# Patient Record
Sex: FEMALE | Race: WHITE | URBAN - METROPOLITAN AREA
[De-identification: names, ages, dates, MRNs, and addresses within clinical notes are randomized per-mention and may not be internally consistent; named-entity substitution may affect disease eponyms.]

---

## 2018-08-13 ENCOUNTER — TELEPHONE (OUTPATIENT)
Dept: RHEUMATOLOGY | Facility: CLINIC | Age: 63
End: 2018-08-13

## 2018-08-13 NOTE — TELEPHONE ENCOUNTER
[facility] Call Center    Phone Message    May a detailed message be left on voicemail: yes    Reason for Call: Other: Pt states that Dr. Nora Benavidez from [facility] rheumatology had sent us a referral for fibromyalgia and PMR testing. Writer checked with referral team, who had not received a referral. Writer explained we had not received a referral, and informed pt that this is needed in order to schedule. Writer did not have a chance to explain the entire process. Also informed pt that rheum providers do not see for fibromyalgia.   Pt wanted to verify where the referral was sent, as she is under the impression it was. Please f/u when available. Pt left the contact info for the referral specialist at [facility], at [phone].    Action Taken: Message Routed To: [facility]

## 2018-08-20 NOTE — TELEPHONE ENCOUNTER
Call was placed to patient regarding the referral we received for fibromyalgia?PMR from Dr. Benavidez at [facility], however, there was no answer. Message was left asking patient to call the clinic back to complete an intake form and discuss if there are outside records needed. Awaiting a call back from the patient.  Karen Franks CMA  8/20/2018 2:35 PM

## 2018-08-20 NOTE — TELEPHONE ENCOUNTER
[facility] Call Center    Phone Message    May a detailed message be left on voicemail: yes    Reason for Call: Other: Pt returned Karen's call - Please call her back as soon as possible     Action Taken: see above

## 2018-08-21 NOTE — TELEPHONE ENCOUNTER
Patient called back.  She is able to be reached best after 4pm or on break from 11:45-12:15 daily.  She has been seen at [facility] in [place], [place] and [place].

## 2018-08-21 NOTE — TELEPHONE ENCOUNTER
Call attempted to patient to discuss referral without success. Left a message for patient to call.  Karen Franks CMA  8/21/2018 12:02 PM

## 2018-08-27 NOTE — TELEPHONE ENCOUNTER
[facility] Call Center    Phone Message    May a detailed message be left on voicemail: yes    Reason for Call: Other: Pt returned Karen's call, She has a very tight schedule so is hard to reach but is getting discouraged with this phone tag. SUSAN     Action Taken: Message routed to:  [facility] Please call Pt back tomorrow 8/28/18

## 2018-08-28 NOTE — TELEPHONE ENCOUNTER
[facility] Call Center    Phone Message    May a detailed message be left on voicemail: yes    Reason for Call: Other: Please follow up with pt, she is returning Karen's call.     Action Taken: Message routed to:  [facility]

## 2018-08-29 NOTE — TELEPHONE ENCOUNTER
Call attempted to reach patient without success. Left a message for patient to return my call.  Karen Franks CMA  8/29/2018 10:01 AM

## 2018-08-29 NOTE — TELEPHONE ENCOUNTER
[facility] Call Center    Phone Message    May a detailed message be left on voicemail: yes    Reason for Call: Other: Patient trying again to get a hold of Karen, she stated she would try again tomorrow.      Action Taken: Message routed to:  [facility] Rheum

## 2018-08-31 NOTE — TELEPHONE ENCOUNTER
Per Rheumatology Team, this should be seen in allergy or dermatology.  Karen Franks CMA  8/31/2018 9:31 AM

## 2018-08-31 NOTE — TELEPHONE ENCOUNTER
General Rheumatology Intake Form    Reason for referral: inpatient desensitization   Referring provider: Nora Bui at [facility]    Past Rheumatologist: Yes. Patient stated that she started with Dr. Meredith at [facility] in 1/12/2015 who told her she has degenerative arthritis and fibromyalgia. She then was referred to Dr. Hdz by her PCP Dr. Salazar who told her that she has arthralgia and wanted to rule out PMR. Patient stated that Dr. Hdz stated she did not have fibromyalgia. Patient then went to Dr. Hdz who was closer to home. Patient then saw Dr. 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     Have you been diagnosed with Fibromyalgia? yes    Who manages your care for this issue now? Dr. Nora Bui     What is your most urgent concern at this time? Patient is unsure at this time.    Have you seen any specialist related to the reason you are coming here? Yes, see above    Where are we expecting records (labs, imaging or pathology) from? In Care Everywhere for [facility].    Informed patient that I will need to bring this to the Rheumatology Team to discuss appropriate scheduling. Patient verbalized understanding.       Karen Franks CMA  8/31/2018 9:00 AM

## 2018-08-31 NOTE — TELEPHONE ENCOUNTER
Spoke with Dr. Herman in Dermatology regarding the referral for inpatient desensitization for cortisone who agreed to see the patient. His nurse will reach out to the patient to set up an appointment. Spoke to patient and gave her this information. Patient verbalized understanding.   Karen Franks CMA  8/31/2018 9:52 AM

## 2024-10-17 ENCOUNTER — MEDICAL CORRESPONDENCE (OUTPATIENT)
Dept: HEALTH INFORMATION MANAGEMENT | Facility: CLINIC | Age: 69
End: 2024-10-17
Payer: COMMERCIAL

## 2024-10-18 ENCOUNTER — TRANSCRIBE ORDERS (OUTPATIENT)
Dept: OTHER | Age: 69
End: 2024-10-18

## 2024-10-18 DIAGNOSIS — Z88.8 ALLERGY TO CORTICOSTEROIDS: Primary | ICD-10-CM
